# Patient Record
Sex: FEMALE | Race: WHITE | NOT HISPANIC OR LATINO | Employment: FULL TIME | URBAN - METROPOLITAN AREA
[De-identification: names, ages, dates, MRNs, and addresses within clinical notes are randomized per-mention and may not be internally consistent; named-entity substitution may affect disease eponyms.]

---

## 2017-02-23 ENCOUNTER — TELEPHONE (OUTPATIENT)
Dept: ENDOCRINOLOGY | Facility: CLINIC | Age: 42
End: 2017-02-23

## 2017-02-23 NOTE — TELEPHONE ENCOUNTER
Spoke with patient in detail whom stated that she has not been feeling her self, hair been thinning, not sleeping well and many other issues. Informed patient she has not seen the doctor since 2015. Which patient agreed so I informed patient she needed a follow up appointment so she can discuss issues with the doctor so she can get on the right track. Patient verbalize understanding and I assisted patient in scheduling follow up with Dr Ortega. Patient stated that date and time was fine. Mailed appointment slip out to patient

## 2017-02-23 NOTE — TELEPHONE ENCOUNTER
----- Message from Beena Lee sent at 2/16/2017  9:15 AM CST -----  Contact: Ingrid tel:  [phone]  Pt.says she is having issues w/ thyroid and needs to speak to you about it today.    Pls call.   May need to have some labs done.

## 2017-03-29 ENCOUNTER — OFFICE VISIT (OUTPATIENT)
Dept: ENDOCRINOLOGY | Facility: CLINIC | Age: 42
End: 2017-03-29
Payer: COMMERCIAL

## 2017-03-29 VITALS
HEART RATE: 70 BPM | BODY MASS INDEX: 20.65 KG/M2 | DIASTOLIC BLOOD PRESSURE: 64 MMHG | HEIGHT: 62 IN | SYSTOLIC BLOOD PRESSURE: 94 MMHG | WEIGHT: 112.19 LBS

## 2017-03-29 DIAGNOSIS — R53.83 FATIGUE, UNSPECIFIED TYPE: Primary | ICD-10-CM

## 2017-03-29 DIAGNOSIS — E05.00 GRAVES' DISEASE: ICD-10-CM

## 2017-03-29 PROCEDURE — 99999 PR PBB SHADOW E&M-EST. PATIENT-LVL III: CPT | Mod: PBBFAC,,, | Performed by: INTERNAL MEDICINE

## 2017-03-29 PROCEDURE — 1160F RVW MEDS BY RX/DR IN RCRD: CPT | Mod: S$GLB,,, | Performed by: INTERNAL MEDICINE

## 2017-03-29 PROCEDURE — 99214 OFFICE O/P EST MOD 30 MIN: CPT | Mod: S$GLB,,, | Performed by: INTERNAL MEDICINE

## 2017-03-29 NOTE — PROGRESS NOTES
Subjective:     Patient ID: Ingrid Gongora is a 42 y.o. female.    Chief Complaint: Graves' Disease    HPI:   Ms. Gongora is a 42 y.o. female who is here for a consult visit for evaluation Graves' disease, last visit with me was 10/2013. Dose of mmi was titrated down to zero in 2/2016.   Diagnosis of Graves disease was made four years ago and was started on methimazole therapy.     Currently reports increased weight, fatigue and requiring sleep, heat sensitivity that mostly occurs at night.   Denies any palpitations. Denies new rash.   LMP one and half weeks ago, slightly irregular but not unusual.     Taking mg for migraines, which has helped  Taking biotin and a probiotic.     Review of Systems   Constitutional: Negative for chills and fever.   HENT: Negative for congestion and sinus pressure.    Eyes: Negative for visual disturbance.   Respiratory: Negative for chest tightness and shortness of breath.    Cardiovascular: Negative for chest pain, palpitations and leg swelling.   Gastrointestinal: Negative for abdominal pain and vomiting.   Genitourinary: Negative for dysuria.   Musculoskeletal: Negative for arthralgias.   Skin: Negative for rash.   Neurological: Negative for weakness.   Hematological: Does not bruise/bleed easily.   Psychiatric/Behavioral: Negative for sleep disturbance.          Objective:     Physical Exam   Constitutional: She is oriented to person, place, and time. She appears well-developed and well-nourished. No distress.   Eyes: Conjunctivae and EOM are normal. Pupils are equal, round, and reactive to light. No scleral icterus.   No proptosis.    Neck: Normal range of motion. Neck supple. No thyromegaly present.   Cardiovascular: Normal rate and intact distal pulses.    Pulmonary/Chest: Effort normal and breath sounds normal.   Neurological: She is alert and oriented to person, place, and time. She has normal reflexes.   No tremor.   Skin: Skin is warm and dry.   Psychiatric: She has  "a normal mood and affect.       Vitals:    03/29/17 1056   BP: 94/64   BP Location: Right arm   Patient Position: Sitting   BP Method: Manual   Pulse: 70   Weight: 50.9 kg (112 lb 3.4 oz)   Height: 5' 2" (1.575 m)     Results for FANTA PATRICIO (MRN [id number]) as of 3/29/2017 11:07   Ref. Range 11/12/2014 09:00 5/15/2015 08:43 8/21/2015 09:50 2/19/2016 08:35 4/7/2016 09:35   TSH Latest Ref Range: 0.400 - 4.000 uIU/mL 2.035 2.794 2.314 2.620 1.878       Assessment/Plan:     Ms. Patricio is a 42 year old woman who appears clinically euthyroid and Grave's disease that was in remission last year after a long taper of methimazole. Having symptoms but unclear if related to thyroid. Discussed different options including QUACH treatment v another course of MMI.     1. Graves' disease    - TSH; Future  - T4, free; Future    2. Fatigue, unspecified type    - TSH; Future  - T4, free; Future        "

## 2017-03-29 NOTE — MR AVS SNAPSHOT
[facility]  [address]  [facility] [address]  Phone: [phone]  Fax: [phone]                  Ingrid Gongora   3/29/2017 11:00 AM   Office Visit    Description:  Female : 1975   Provider:  Sangeetha Ortega MD   Department:  [facility]           Reason for Visit     Graves' Disease           Diagnoses this Visit        Comments    Fatigue, unspecified type    -  Primary     Graves' disease                To Do List           Future Appointments        Provider Department Dept Phone    3/29/2017 11:45 AM LAB, APPOINTMENT [place] [facility] [phone]      Goals (5 Years of Data)     None      Follow-Up and Disposition     Follow-up and Disposition History      [facility]     [facility] Care [facility] -  Assistance  Registered nurses in the [facility] provide clinical advisement, health education, appointment booking, and other advisory services.  Call for this free service at [phone].             Medications           Message regarding Medications     Verify the changes and/or additions to your medication regime listed below are the same as discussed with your clinician today.  If any of these changes or additions are incorrect, please notify your healthcare provider.        STOP taking these medications     codeine-butalbital-ASA-caffeine 35--40 mg (BUTALBITAL-ASPIRIN-CAFFEINE-CODEINE 83--40 MG) 62--40 mg Cap Take 1 capsule by mouth every 6 (six) hours as needed.    methimazole (TAPAZOLE) 5 MG Tab TAKE (1/2) HALF BY MOUTH THREE TIMES A WEEK    methimazole (TAPAZOLE) 5 MG Tab TAKE (1/2) HALF BY MOUTH THREE TIMES A WEEK    methimazole (TAPAZOLE) 5 MG Tab Half a tablet three times a week           Verify that the below list of medications is an accurate representation of the medications you are currently taking.  If none reported, the list may be blank. If incorrect, please  "contact your healthcare provider. Carry this list with you in case of emergency.           Current Medications     eletriptan (RELPAX) 40 MG tablet Take 40 mg by mouth as needed. may repeat in 2 hours if necessary    propranolol (INDERAL) 20 MG tablet Take 20 mg by mouth every other day.    topiramate (TOPAMAX) 50 MG tablet Take 50 mg by mouth 5 times daily.           Clinical Reference Information           Your Vitals Were     BP Pulse Height Weight Last Period BMI    94/64 (BP Location: Right arm, Patient Position: Sitting, BP Method: Manual) 70 5' 2" (1.575 m) 50.9 kg (112 lb 3.4 oz) (Within Weeks) 20.52 kg/m2      Blood Pressure          Most Recent Value    BP  94/64      Allergies as of 3/29/2017     No Known Allergies      Immunizations Administered on Date of Encounter - 3/29/2017     None      Orders Placed During Today's Visit     Future Labs/Procedures Expected by Expires    Hepatic function panel  3/29/2017 5/28/2018    T4, free  3/29/2017 5/28/2018    Thyrotropin receptor antibody  3/29/2017 5/28/2018    TSH  3/29/2017 5/28/2018      Instructions    Blood work today       Language Assistance Services     ATTENTION: Language assistance services are available, free of charge. Please call [phone].      ATENCIÓN: Si habla shalonda, tiene a morales disposición servicios gratuitos de asistencia lingüística. Llame al [phone].     CHÚ Ý: N?u b?n nói Ti?ng Vi?t, có các d?ch v? h? tr? ngôn ng? mi?n phí dành cho b?n. G?i s? [phone].         [facility] complies with applicable Federal civil rights laws and does not discriminate on the basis of race, color, national origin, age, disability, or sex.        "

## 2017-04-05 ENCOUNTER — PATIENT MESSAGE (OUTPATIENT)
Dept: ENDOCRINOLOGY | Facility: CLINIC | Age: 42
End: 2017-04-05

## 2019-11-05 ENCOUNTER — PATIENT MESSAGE (OUTPATIENT)
Dept: ENDOCRINOLOGY | Facility: CLINIC | Age: 44
End: 2019-11-05

## 2019-11-05 DIAGNOSIS — E05.00 GRAVES' DISEASE: Primary | ICD-10-CM

## 2019-11-11 ENCOUNTER — LAB VISIT (OUTPATIENT)
Dept: LAB | Facility: HOSPITAL | Age: 44
End: 2019-11-11
Attending: INTERNAL MEDICINE
Payer: COMMERCIAL

## 2019-11-11 DIAGNOSIS — E05.00 GRAVES' DISEASE: ICD-10-CM

## 2019-11-11 LAB — TSH SERPL DL<=0.005 MIU/L-ACNC: 1.4 UIU/ML (ref 0.4–4)

## 2019-11-11 PROCEDURE — 83520 IMMUNOASSAY QUANT NOS NONAB: CPT

## 2019-11-11 PROCEDURE — 84443 ASSAY THYROID STIM HORMONE: CPT

## 2019-11-12 LAB — TSH RECEP AB SER-ACNC: 1.1 IU/L (ref 0–1.75)

## 2019-11-13 ENCOUNTER — PATIENT MESSAGE (OUTPATIENT)
Dept: ENDOCRINOLOGY | Facility: CLINIC | Age: 44
End: 2019-11-13

## 2019-11-22 ENCOUNTER — TELEPHONE (OUTPATIENT)
Dept: GASTROENTEROLOGY | Facility: CLINIC | Age: 44
End: 2019-11-22

## 2019-11-22 NOTE — TELEPHONE ENCOUNTER
Patient reports when she had a bowel movement , she had bright red blood on toilet paper when she wiped and also in the toilet. She states she is a little concerned. Patient reports seeing no further blood. She states this has happened one time before. She denies any symptoms such as weakness, fatigue, dizziness, sob, abdominal cramping or pain. She reports she has a history of IBS constipation. Appointment offered and accepted for 12/5/19 at 1 pm with OKSANA Miller NP. Instructed patient to go to urgent care or ER if she develops any symptoms listed above or sees any more blood. Patient verbalized understanding.

## 2019-11-22 NOTE — TELEPHONE ENCOUNTER
Delmar Castellano requesting to be seen today  Please advise   First available is at [facility] on 12/31 with Dr. Andujar

## 2019-11-22 NOTE — TELEPHONE ENCOUNTER
----- Message from Carlene Martinez sent at 11/22/2019  7:38 AM CST -----  Contact: Pt  Pt is requesting to be seen today has blood in her stool requesting a callback at [phone]

## 2019-12-05 ENCOUNTER — OFFICE VISIT (OUTPATIENT)
Dept: GASTROENTEROLOGY | Facility: CLINIC | Age: 44
End: 2019-12-05
Payer: COMMERCIAL

## 2019-12-05 ENCOUNTER — LAB VISIT (OUTPATIENT)
Dept: LAB | Facility: HOSPITAL | Age: 44
End: 2019-12-05
Payer: COMMERCIAL

## 2019-12-05 ENCOUNTER — TELEPHONE (OUTPATIENT)
Dept: GASTROENTEROLOGY | Facility: CLINIC | Age: 44
End: 2019-12-05

## 2019-12-05 VITALS
HEIGHT: 62 IN | WEIGHT: 110.69 LBS | SYSTOLIC BLOOD PRESSURE: 101 MMHG | BODY MASS INDEX: 20.37 KG/M2 | HEART RATE: 70 BPM | DIASTOLIC BLOOD PRESSURE: 69 MMHG

## 2019-12-05 DIAGNOSIS — R19.8 CHANGE IN BOWEL MOVEMENT: ICD-10-CM

## 2019-12-05 DIAGNOSIS — K62.5 BRBPR (BRIGHT RED BLOOD PER RECTUM): ICD-10-CM

## 2019-12-05 DIAGNOSIS — Z12.11 SPECIAL SCREENING FOR MALIGNANT NEOPLASMS, COLON: Primary | ICD-10-CM

## 2019-12-05 DIAGNOSIS — R19.8 CHANGE IN BOWEL MOVEMENT: Primary | ICD-10-CM

## 2019-12-05 DIAGNOSIS — R63.0 DECREASE IN APPETITE: ICD-10-CM

## 2019-12-05 LAB
ALBUMIN SERPL BCP-MCNC: 4 G/DL (ref 3.5–5.2)
ALP SERPL-CCNC: 43 U/L (ref 55–135)
ALT SERPL W/O P-5'-P-CCNC: 13 U/L (ref 10–44)
ANION GAP SERPL CALC-SCNC: 5 MMOL/L (ref 8–16)
AST SERPL-CCNC: 16 U/L (ref 10–40)
BASOPHILS # BLD AUTO: 0.02 K/UL (ref 0–0.2)
BASOPHILS NFR BLD: 0.4 % (ref 0–1.9)
BILIRUB SERPL-MCNC: 0.5 MG/DL (ref 0.1–1)
BUN SERPL-MCNC: 15 MG/DL (ref 6–20)
CALCIUM SERPL-MCNC: 9.2 MG/DL (ref 8.7–10.5)
CHLORIDE SERPL-SCNC: 110 MMOL/L (ref 95–110)
CO2 SERPL-SCNC: 25 MMOL/L (ref 23–29)
CREAT SERPL-MCNC: 1.1 MG/DL (ref 0.5–1.4)
DIFFERENTIAL METHOD: ABNORMAL
EOSINOPHIL # BLD AUTO: 0 K/UL (ref 0–0.5)
EOSINOPHIL NFR BLD: 0.4 % (ref 0–8)
ERYTHROCYTE [DISTWIDTH] IN BLOOD BY AUTOMATED COUNT: 13.4 % (ref 11.5–14.5)
EST. GFR  (AFRICAN AMERICAN): >60 ML/MIN/1.73 M^2
EST. GFR  (NON AFRICAN AMERICAN): >60 ML/MIN/1.73 M^2
GLUCOSE SERPL-MCNC: 82 MG/DL (ref 70–110)
HCT VFR BLD AUTO: 41.1 % (ref 37–48.5)
HGB BLD-MCNC: 14.2 G/DL (ref 12–16)
IMM GRANULOCYTES # BLD AUTO: 0.01 K/UL (ref 0–0.04)
IMM GRANULOCYTES NFR BLD AUTO: 0.2 % (ref 0–0.5)
LYMPHOCYTES # BLD AUTO: 1.4 K/UL (ref 1–4.8)
LYMPHOCYTES NFR BLD: 27.8 % (ref 18–48)
MCH RBC QN AUTO: 32 PG (ref 27–31)
MCHC RBC AUTO-ENTMCNC: 34.5 G/DL (ref 32–36)
MCV RBC AUTO: 93 FL (ref 82–98)
MONOCYTES # BLD AUTO: 0.5 K/UL (ref 0.3–1)
MONOCYTES NFR BLD: 9.8 % (ref 4–15)
NEUTROPHILS # BLD AUTO: 3.1 K/UL (ref 1.8–7.7)
NEUTROPHILS NFR BLD: 61.4 % (ref 38–73)
NRBC BLD-RTO: 0 /100 WBC
PLATELET # BLD AUTO: 245 K/UL (ref 150–350)
PMV BLD AUTO: 10 FL (ref 9.2–12.9)
POTASSIUM SERPL-SCNC: 4.1 MMOL/L (ref 3.5–5.1)
PROT SERPL-MCNC: 7.3 G/DL (ref 6–8.4)
RBC # BLD AUTO: 4.44 M/UL (ref 4–5.4)
SODIUM SERPL-SCNC: 140 MMOL/L (ref 136–145)
WBC # BLD AUTO: 5.11 K/UL (ref 3.9–12.7)

## 2019-12-05 PROCEDURE — 99999 PR PBB SHADOW E&M-EST. PATIENT-LVL III: ICD-10-PCS | Mod: PBBFAC,,, | Performed by: NURSE PRACTITIONER

## 2019-12-05 PROCEDURE — 85025 COMPLETE CBC W/AUTO DIFF WBC: CPT

## 2019-12-05 PROCEDURE — 80053 COMPREHEN METABOLIC PANEL: CPT

## 2019-12-05 PROCEDURE — 99204 OFFICE O/P NEW MOD 45 MIN: CPT | Mod: S$GLB,,, | Performed by: NURSE PRACTITIONER

## 2019-12-05 PROCEDURE — 99999 PR PBB SHADOW E&M-EST. PATIENT-LVL III: CPT | Mod: PBBFAC,,, | Performed by: NURSE PRACTITIONER

## 2019-12-05 PROCEDURE — 99204 PR OFFICE/OUTPT VISIT, NEW, LEVL IV, 45-59 MIN: ICD-10-PCS | Mod: S$GLB,,, | Performed by: NURSE PRACTITIONER

## 2019-12-05 PROCEDURE — 36415 COLL VENOUS BLD VENIPUNCTURE: CPT

## 2019-12-05 RX ORDER — SODIUM, POTASSIUM,MAG SULFATES 17.5-3.13G
1 SOLUTION, RECONSTITUTED, ORAL ORAL DAILY
Qty: 1 KIT | Refills: 0 | Status: SHIPPED | OUTPATIENT
Start: 2019-12-05 | End: 2019-12-07

## 2019-12-05 RX ORDER — BUTALBITAL, ACETAMINOPHEN AND CAFFEINE 50; 325; 40 MG/1; MG/1; MG/1
1 TABLET ORAL EVERY 4 HOURS PRN
COMMUNITY

## 2019-12-05 RX ORDER — IPRATROPIUM BROMIDE 21 UG/1
SPRAY, METERED NASAL
Refills: 0 | COMMUNITY
Start: 2019-12-02

## 2019-12-05 RX ORDER — FLUCONAZOLE 200 MG/1
TABLET ORAL
Refills: 2 | COMMUNITY
Start: 2019-10-14

## 2019-12-05 RX ORDER — TRANEXAMIC ACID 650 MG/1
TABLET ORAL
Refills: 0 | COMMUNITY
Start: 2019-11-11 | End: 2023-12-12

## 2019-12-05 RX ORDER — FLUCONAZOLE 150 MG/1
TABLET ORAL
Refills: 0 | COMMUNITY
Start: 2019-11-25

## 2019-12-05 RX ORDER — TRAMADOL HYDROCHLORIDE 50 MG/1
TABLET ORAL
Refills: 1 | COMMUNITY
Start: 2019-11-14

## 2019-12-05 NOTE — PROGRESS NOTES
"    [facility] Consultation Note    Reason for Consult:  The primary encounter diagnosis was Change in bowel movement. Diagnoses of Decrease in appetite and BRBPR (bright red blood per rectum) were also pertinent to this visit.    PCP:   Licha Jeff   No address on file    Referring MD:  Aaareferral Self  No address on file    HPI:  This is a 44 y.o. female here for evaluation of constipation, diarrhea and BRBPR.     Diarrhea for many years.  Reports she was seen by Dr. Turk many years ago and had a complete workup that was normal and was diagnosed with IBS.  11/22/2019 had a painful BM with with BRBPR, on the TP. She had it once before a couple weeks prior to that. Denies external hemorrhoids.  New onset of constipation. Constipation described as urgency without a BM, and can go a few days without a BM. Use to have loose bowels, many throughout the day. Stool is not as "intact" now they are loose.   Occasional abd pain. Has gas all the time. Has to take Gas-x.  Was taking miralax   She has identified foods such as meat and cream sauces bc it upsets her stomach.  Use to be on Nexium due to reported  hx of ulcers.   Denies n/v, melena  +decrease in appetite that she is unsure when that started. Also has graves disease..     ROS:  Constitutional: No fevers, chills, No weight loss  ENT: + allergies  CV: + chest pain at night  Pulm: No cough, No shortness of breath  Ophtho:+ vision changes  GI: see HPI  Derm: No rash  Heme:  No bruising  MSK: No arthritis  : No dysuria, No hematuria  Neuro: No syncope, No seizure  Psych: No anxiety, No depression    Medical History:  has a past medical history of Abnormal non-fasting glucose (2013), Graves disease, Hyperthyroidism, and Migraine headache.    Surgical History:  has a past surgical history that includes Hip arthroscopy w/ labral repair (2013).    Family History: family history includes Breast cancer in her mother and sister; Hypertension in " "her father; Prostate cancer in her maternal grandfather, maternal uncle, maternal uncle, maternal uncle, and maternal uncle; Thyroid disease in her mother..     Social History:  reports that she quit smoking about 19 years ago. Her smoking use included cigarettes. She has a 15.00 pack-year smoking history. She does not have any smokeless tobacco history on file. She reports that she drinks alcohol. She reports that she does not use drugs.    Review of patient's allergies indicates:  No Known Allergies    Current Outpatient Medications on File Prior to Visit   Medication Sig Dispense Refill    butalbital-acetaminophen-caffeine -40 mg (FIORICET, ESGIC) -40 mg per tablet Take 1 tablet by mouth every 4 (four) hours as needed.      eletriptan (RELPAX) 40 MG tablet Take 40 mg by mouth as needed. may repeat in 2 hours if necessary      fluconazole (DIFLUCAN) 150 MG Tab   0    fluconazole (DIFLUCAN) 200 MG Tab   2    FLUZONE QUAD 2468-8529, PF, 60 mcg (15 mcg x 4)/0.5 mL Syrg PHARMACIST ADMINISTERED IMMUNIZATION ADMINISTERED AT TIME OF DISPENSING  0    ipratropium (ATROVENT) 0.03 % nasal spray   0    topiramate (TOPAMAX) 50 MG tablet Take 50 mg by mouth 5 times daily.      traMADol (ULTRAM) 50 mg tablet   1    tranexamic acid (LYSTEDA) 650 mg tablet   0    [DISCONTINUED] propranolol (INDERAL) 20 MG tablet Take 20 mg by mouth every other day.       No current facility-administered medications on file prior to visit.          Objective Findings:    Vital Signs:  /69 (BP Location: Right arm)   Pulse 70   Ht 5' 2" (1.575 m)   Wt 50.2 kg (110 lb 10.7 oz)   BMI 20.24 kg/m²   Body mass index is 20.24 kg/m².    Physical Exam:  General Appearance: Well appearing in no acute distress  Head:   Normocephalic, without obvious abnormality  Eyes:    No scleral icterus  ENT: Neck supple  Lungs: CTA bilaterally in anterior and posterior fields, no wheezes, no crackles.  Heart:  Regular rate and rhythm, S1, S2 " normal, no murmurs heard  Abdomen: Soft, non tender, non distended with positive bowel sounds in all four quadrants. No hepatosplenomegaly, ascites, or mass  Extremities: No edema  Skin: No rash  Neurologic: AAO x 3      Labs:  Lab Results   Component Value Date    WBC 8.01 03/25/2004    HGB 13.6 03/25/2004    HCT 41.0 03/25/2004     03/25/2004    ALT 18 03/29/2017    AST 21 03/29/2017     05/05/2005    K 4.6 05/05/2005     05/05/2005    CREATININE 0.9 05/05/2005    BUN 22 05/05/2005    CO2 26 05/05/2005    TSH 1.404 11/11/2019       Imaging reviewed:    Endoscopy: reviewed    EGD  4/08/2008 for epigastric pain and nausea.  Normal study.    Flex sig 3/16/2005    Assessment:  Ms. Gongora is a 44 y.o. WF with    1. Change in bowel movement    2. Decrease in appetite    3. BRBPR (bright red blood per rectum)         Patient reports she has a chronic history of diarrhea, at least since 2005 with when she was worked up by Dr. Turk and was diagnosed with IBS.  Most recently however she has developed constipation where she can go days without having a bowel movement, this is new for her.  She also has recently noted bright red blood per rectum.  Does have a history of internal hemorrhoids.  But given the recent change in her bowel habits and the bleeding will perform colonoscopy.    Recommendations:  1.  Patient to start taking a fiber supplement such as Citrucel or Benefiber  2.  Labs  3.  Colonoscopy    Follow-up pending colonoscopy    Order summary:  Orders Placed This Encounter    Comprehensive metabolic panel    CBC auto differential    Case request GI: COLONOSCOPY         Thank you so much for allowing me to participate in the care of Ingrid Gongora    Ingrid Miller, IVELISSE-KERLINE

## 2019-12-05 NOTE — TELEPHONE ENCOUNTER
MA contacted pt to give test results per Ingrid. Pt verbalized understanding.           ----- Message from Ingrid Miller NP sent at 12/5/2019  4:50 PM CST -----  Labs look good

## 2019-12-27 ENCOUNTER — PATIENT MESSAGE (OUTPATIENT)
Dept: ENDOCRINOLOGY | Facility: CLINIC | Age: 44
End: 2019-12-27

## 2019-12-27 NOTE — TELEPHONE ENCOUNTER
She would need to see a provider to have it looked it (derm or pcp)... There are many reasons for hair loss

## 2020-01-06 ENCOUNTER — TELEPHONE (OUTPATIENT)
Dept: GASTROENTEROLOGY | Facility: CLINIC | Age: 45
End: 2020-01-06

## 2020-01-06 NOTE — TELEPHONE ENCOUNTER
MA spoke with pt. Pt wanted to make Ingrid aware her uncle  of colon cancer not prostate.       ----- Message from Mary Ann Medrano sent at 2020  1:16 PM CST -----  Contact: patient  Please call patient at [phone] have information for the nurse about colon cancer in the family waiting on a call back thanks.

## 2020-01-09 ENCOUNTER — ANESTHESIA (OUTPATIENT)
Dept: ENDOSCOPY | Facility: HOSPITAL | Age: 45
End: 2020-01-09
Payer: COMMERCIAL

## 2020-01-09 ENCOUNTER — HOSPITAL ENCOUNTER (OUTPATIENT)
Facility: HOSPITAL | Age: 45
Discharge: HOME | End: 2020-01-09
Attending: INTERNAL MEDICINE | Admitting: INTERNAL MEDICINE
Payer: COMMERCIAL

## 2020-01-09 ENCOUNTER — ANESTHESIA EVENT (OUTPATIENT)
Dept: ENDOSCOPY | Facility: HOSPITAL | Age: 45
End: 2020-01-09
Payer: COMMERCIAL

## 2020-01-09 VITALS
OXYGEN SATURATION: 100 % | HEART RATE: 68 BPM | SYSTOLIC BLOOD PRESSURE: 101 MMHG | TEMPERATURE: 98 F | RESPIRATION RATE: 17 BRPM | WEIGHT: 110 LBS | HEIGHT: 62 IN | DIASTOLIC BLOOD PRESSURE: 57 MMHG | BODY MASS INDEX: 20.24 KG/M2

## 2020-01-09 DIAGNOSIS — K62.5 RECTAL BLEEDING: ICD-10-CM

## 2020-01-09 DIAGNOSIS — K62.5 RECTAL BLEED: Primary | ICD-10-CM

## 2020-01-09 PROCEDURE — 45380 PR COLONOSCOPY,BIOPSY: ICD-10-PCS | Mod: ,,, | Performed by: INTERNAL MEDICINE

## 2020-01-09 PROCEDURE — 37000008 HC ANESTHESIA 1ST 15 MINUTES: Performed by: INTERNAL MEDICINE

## 2020-01-09 PROCEDURE — 88305 TISSUE EXAM BY PATHOLOGIST: CPT | Performed by: PATHOLOGY

## 2020-01-09 PROCEDURE — 27201012 HC FORCEPS, HOT/COLD, DISP: Performed by: INTERNAL MEDICINE

## 2020-01-09 PROCEDURE — 88305 TISSUE EXAM BY PATHOLOGIST: CPT | Mod: 26,,, | Performed by: PATHOLOGY

## 2020-01-09 PROCEDURE — 45380 COLONOSCOPY AND BIOPSY: CPT | Performed by: INTERNAL MEDICINE

## 2020-01-09 PROCEDURE — 37000009 HC ANESTHESIA EA ADD 15 MINS: Performed by: INTERNAL MEDICINE

## 2020-01-09 PROCEDURE — E9220 PRA ENDO ANESTHESIA: HCPCS | Mod: ,,, | Performed by: NURSE ANESTHETIST, CERTIFIED REGISTERED

## 2020-01-09 PROCEDURE — 88305 TISSUE EXAM BY PATHOLOGIST: ICD-10-PCS | Mod: 26,,, | Performed by: PATHOLOGY

## 2020-01-09 PROCEDURE — 63600175 PHARM REV CODE 636 W HCPCS: Performed by: INTERNAL MEDICINE

## 2020-01-09 PROCEDURE — 63600175 PHARM REV CODE 636 W HCPCS: Performed by: NURSE ANESTHETIST, CERTIFIED REGISTERED

## 2020-01-09 PROCEDURE — E9220 PRA ENDO ANESTHESIA: ICD-10-PCS | Mod: ,,, | Performed by: NURSE ANESTHETIST, CERTIFIED REGISTERED

## 2020-01-09 PROCEDURE — 45380 COLONOSCOPY AND BIOPSY: CPT | Mod: ,,, | Performed by: INTERNAL MEDICINE

## 2020-01-09 RX ORDER — LIDOCAINE HCL/PF 100 MG/5ML
SYRINGE (ML) INTRAVENOUS
Status: DISCONTINUED | OUTPATIENT
Start: 2020-01-09 | End: 2020-01-09

## 2020-01-09 RX ORDER — PROPOFOL 10 MG/ML
VIAL (ML) INTRAVENOUS CONTINUOUS PRN
Status: DISCONTINUED | OUTPATIENT
Start: 2020-01-09 | End: 2020-01-09

## 2020-01-09 RX ORDER — SODIUM CHLORIDE 9 MG/ML
INJECTION, SOLUTION INTRAVENOUS CONTINUOUS
Status: DISCONTINUED | OUTPATIENT
Start: 2020-01-09 | End: 2020-01-09 | Stop reason: HOSPADM

## 2020-01-09 RX ORDER — PROPOFOL 10 MG/ML
VIAL (ML) INTRAVENOUS
Status: DISCONTINUED | OUTPATIENT
Start: 2020-01-09 | End: 2020-01-09

## 2020-01-09 RX ADMIN — PROPOFOL 50 MG: 10 INJECTION, EMULSION INTRAVENOUS at 12:01

## 2020-01-09 RX ADMIN — PROPOFOL 150 MCG/KG/MIN: 10 INJECTION, EMULSION INTRAVENOUS at 12:01

## 2020-01-09 RX ADMIN — SODIUM CHLORIDE: 0.9 INJECTION, SOLUTION INTRAVENOUS at 12:01

## 2020-01-09 RX ADMIN — PROPOFOL 30 MG: 10 INJECTION, EMULSION INTRAVENOUS at 12:01

## 2020-01-09 RX ADMIN — LIDOCAINE HYDROCHLORIDE 50 MG: 20 INJECTION, SOLUTION INTRAVENOUS at 12:01

## 2020-01-09 RX ADMIN — PROPOFOL 20 MG: 10 INJECTION, EMULSION INTRAVENOUS at 12:01

## 2020-01-09 RX ADMIN — PROPOFOL 30 MG: 10 INJECTION, EMULSION INTRAVENOUS at 01:01

## 2020-01-09 NOTE — H&P
[facility]  History & Physical    Subjective:      Chief Complaint/Reason for Admission: rectal bleedStepeulogio Gongora is a 44 y.o.    Past Medical History:   Diagnosis Date    Abnormal non-fasting glucose     Graves disease     Hyperthyroidism     Migraine headache      Past Surgical History:   Procedure Laterality Date    HIP ARTHROSCOPY W/ LABRAL REPAIR       Family History   Problem Relation Age of Onset    Thyroid disease Mother     Breast cancer Mother     Hypertension Father     Breast cancer Sister     Prostate cancer Maternal Uncle     Prostate cancer Maternal Grandfather     Prostate cancer Maternal Uncle     Colon cancer Maternal Uncle     Prostate cancer Maternal Uncle     Celiac disease Neg Hx     Colon polyps Neg Hx     Crohn's disease Neg Hx     Esophageal cancer Neg Hx     Liver cancer Neg Hx     Liver disease Neg Hx     Rectal cancer Neg Hx     Stomach cancer Neg Hx      Social History     Tobacco Use    Smoking status: Former Smoker     Packs/day: 1.00     Years: 15.00     Pack years: 15.00     Types: Cigarettes     Last attempt to quit: 2000     Years since quittin.6   Substance Use Topics    Alcohol use: Yes     Comment: very rarely    Drug use: No       PTA Medications   Medication Sig    butalbital-acetaminophen-caffeine -40 mg (FIORICET, ESGIC) -40 mg per tablet Take 1 tablet by mouth every 4 (four) hours as needed.    eletriptan (RELPAX) 40 MG tablet Take 40 mg by mouth as needed. may repeat in 2 hours if necessary    fluconazole (DIFLUCAN) 150 MG Tab     fluconazole (DIFLUCAN) 200 MG Tab     FLUZONE QUAD 6090-1410, PF, 60 mcg (15 mcg x 4)/0.5 mL Syrg PHARMACIST ADMINISTERED IMMUNIZATION ADMINISTERED AT TIME OF DISPENSING    ipratropium (ATROVENT) 0.03 % nasal spray     topiramate (TOPAMAX) 50 MG tablet Take 50 mg by mouth 5 times daily.    traMADol (ULTRAM) 50 mg tablet     tranexamic acid (LYSTEDA) 650 mg  tablet      Review of patient's allergies indicates:  No Known Allergies     Review of Systems   Respiratory: Negative.    Cardiovascular: Negative.        Objective:      Vital Signs (Most Recent)  Temp: 98.2 °F (36.8 °C) (01/09/20 1229)  Pulse: 67 (01/09/20 1229)  Resp: 15 (01/09/20 1229)  BP: (!) 101/57 (01/09/20 1229)  SpO2: 100 % (01/09/20 1229)    Vital Signs Range (Last 24H):  Temp:  [98.2 °F (36.8 °C)]   Pulse:  [67]   Resp:  [15]   BP: (101)/(57)   SpO2:  [100 %]     Physical Exam   Cardiovascular: Normal rate and regular rhythm.   Pulmonary/Chest: Effort normal and breath sounds normal.       Data Review:     ECG: .     Assessment:      There are no hospital problems to display for this patient.      Plan:    Indication for procedure:    ASA:II  Airway normal  Malampati class:per anes    Personal and family history negative for anesthesia problems    Plan:colon  Anesthesia plan: general

## 2020-01-09 NOTE — TRANSFER OF CARE
"Anesthesia Transfer of Care Note    Patient: Ingrid Gongora    Procedure(s) Performed: Procedure(s) (LRB):  COLONOSCOPY (N/A)    Patient location: PACU    Anesthesia Type: general    Transport from OR: Transported from OR on room air with adequate spontaneous ventilation    Post pain: adequate analgesia    Post assessment: no apparent anesthetic complications and tolerated procedure well    Post vital signs: stable    Level of consciousness: awake, alert and oriented    Nausea/Vomiting: no nausea/vomiting    Complications: none    Transfer of care protocol was followed      Last vitals:   Visit Vitals  BP 91/52   Pulse 74   Temp 36.8 °C (98.2 °F)   Resp 15   Ht 5' 2" (1.575 m)   Wt 49.9 kg (110 lb)   SpO2 100%   BMI 20.12 kg/m²     "

## 2020-01-09 NOTE — DISCHARGE INSTRUCTIONS

## 2020-01-09 NOTE — [employer] PATIENT INSTRUCTIONS
Discharge Summary/Instructions after an Endoscopic Procedure  Patient Name: Ingrid Gongora  Patient MRN: [id number]  Patient YOB: 1975 Thursday, January 09, 2020  Theo Turk MD  RESTRICTIONS:  During your procedure today, you received medications for sedation.  These   medications may affect your judgment, balance and coordination.  Therefore,   for 24 hours, you have the following restrictions:   - DO NOT drive a car, operate machinery, make legal/financial decisions,   sign important papers or drink alcohol.    ACTIVITY:  Today: no heavy lifting, straining or running due to procedural   sedation/anesthesia.  The following day: return to full activity including work.  DIET:  Eat and drink normally unless instructed otherwise.     TREATMENT FOR COMMON SIDE EFFECTS:  - Mild abdominal pain, nausea, belching, bloating or excessive gas:  rest,   eat lightly and use a heating pad.  - Sore Throat: treat with throat lozenges and/or gargle with warm salt   water.  - Because air was used during the procedure, expelling large amounts of air   from your rectum or belching is normal.  - If a bowel prep was taken, you may not have a bowel movement for 1-3 days.    This is normal.  SYMPTOMS TO WATCH FOR AND REPORT TO YOUR PHYSICIAN:  1. Abdominal pain or bloating, other than gas cramps.  2. Chest pain.  3. Back pain.  4. Signs of infection such as: chills or fever occurring within 24 hours   after the procedure.  5. Rectal bleeding, which would show as bright red, maroon, or black stools.   (A tablespoon of blood from the rectum is not serious, especially if   hemorrhoids are present.)  6. Vomiting.  7. Weakness or dizziness.  GO DIRECTLY TO THE NEAREST EMERGENCY ROOM IF YOU HAVE ANY OF THE FOLLOWING:      Difficulty breathing              Chills and/or fever over 101 F   Persistent vomiting and/or vomiting blood   Severe abdominal pain   Severe chest pain   Black, tarry stools   Bleeding- more than one  tablespoon   Any other symptom or condition that you feel may need urgent attention  Your doctor recommends these additional instructions:  If any biopsies were taken, your doctors clinic will contact you in 1 to 2   weeks with any results.  - Patient has a contact number available for emergencies.  The signs and   symptoms of potential delayed complications were discussed with the   patient.  Return to normal activities tomorrow.  Written discharge   instructions were provided to the patient.   - Discharge patient to home (ambulatory).   - Resume previous diet.   - Continue present medications.   - If the pathology report reveals adenomatous tissue, then repeat the   colonoscopy for surveillance in 5 years.  For questions, problems or results please call your physician - Theo Turk MD at Work:  [phone].  [facility], EMERGENCY ROOM PHONE NUMBER: [phone]  IF A COMPLICATION OR EMERGENCY SITUATION ARISES AND YOU ARE UNABLE TO REACH   YOUR PHYSICIAN - GO DIRECTLY TO THE EMERGENCY ROOM.  Theo Turk MD  1/9/2020 1:20:17 PM  This report has been verified and signed electronically.  [employer]

## 2020-01-09 NOTE — ANESTHESIA POSTPROCEDURE EVALUATION
Anesthesia Post Evaluation    Patient: Ingrid Gongora    Procedure(s) Performed: Procedure(s) (LRB):  COLONOSCOPY (N/A)    Final Anesthesia Type: general    Patient location during evaluation: GI PACU  Patient participation: Yes- Able to Participate  Level of consciousness: awake and alert and oriented  Post-procedure vital signs: reviewed and stable  Pain management: adequate  Airway patency: patent    PONV status at discharge: No PONV  Anesthetic complications: no      Cardiovascular status: hemodynamically stable  Respiratory status: unassisted, spontaneous ventilation and room air  Hydration status: euvolemic  Follow-up not needed.          Vitals Value Taken Time   /57 1/9/2020  1:52 PM   Temp 36.7 °C (98.1 °F) 1/9/2020  1:22 PM   Pulse 68 1/9/2020  1:52 PM   Resp 17 1/9/2020  1:52 PM   SpO2 100 % 1/9/2020  1:52 PM         Event Time     Out of Recovery 13:52:00          Pain/Luna Score: Luna Score: 10 (1/9/2020  1:52 PM)

## 2020-01-09 NOTE — ANESTHESIA PREPROCEDURE EVALUATION
01/09/2020  Ingrid Gongora is a 44 y.o., female.  Past Medical History:   Diagnosis Date    Abnormal non-fasting glucose 2013    Graves disease     Hyperthyroidism     Migraine headache      Patient Active Problem List   Diagnosis    Graves' disease    Rectal bleeding         Anesthesia Evaluation    I have reviewed the Patient Summary Reports.     I have reviewed the Medications.     Review of Systems  Anesthesia Hx:  No problems with previous Anesthesia Denies Hx of Anesthetic complications  Denies Family Hx of Anesthesia complications.  Personal Hx of Anesthesia complications Slow To Awaken/Delayed Emergence and mild, somewhat sensitive to sedation / narcotics   Hematology/Oncology:  Hematology Normal   Oncology Normal     EENT/Dental:EENT/Dental Normal   Cardiovascular:  Cardiovascular Normal Exercise tolerance: good     Pulmonary:  Pulmonary Normal    Renal/:  Renal/ Normal     Hepatic/GI:  Hepatic/GI Normal Bowel Prep.    Musculoskeletal:  Musculoskeletal Normal    Neurological:   Headaches    Endocrine:   Hyperthyroidism    Dermatological:  Skin Normal    Psych:  Psychiatric Normal           Physical Exam  General:  Well nourished    Airway/Jaw/Neck:  Airway Findings: Mouth Opening: Normal Tongue: Normal  General Airway Assessment: Adult  Mallampati: II  TM Distance: Normal, at least 6 cm  Jaw/Neck Findings:  Neck ROM: Normal ROM     Eyes/Ears/Nose:  EYES/EARS/NOSE FINDINGS: Normal   Dental:  Dental Findings: In tact   Chest/Lungs:  Chest/Lungs Findings: Clear to auscultation, Normal Respiratory Rate     Heart/Vascular:  Heart Findings: Rate: Normal  Sounds: Normal     Abdomen:  Abdomen Findings: Normal    Musculoskeletal:  Musculoskeletal Findings: Normal   Skin:  Skin Findings: Normal    Mental Status:  Mental Status Findings:  Cooperative, Alert and Oriented         Anesthesia  Plan  Type of Anesthesia, risks & benefits discussed:  Anesthesia Type:  general  Patient's Preference: General  Intra-op Monitoring Plan: standard ASA monitors  Intra-op Monitoring Plan Comments:   Post Op Pain Control Plan:   Post Op Pain Control Plan Comments:   Induction:   IV  Beta Blocker:  Patient is not currently on a Beta-Blocker (No further documentation required).       Informed Consent: Patient understands risks and agrees with Anesthesia plan.  Questions answered. Anesthesia consent signed with patient.  ASA Score: 2     Day of Surgery Review of History & Physical: I have interviewed and examined the patient. I have reviewed the patient's H&P dated:  There are no significant changes.  H&P update referred to the provider.         Ready For Surgery From Anesthesia Perspective.

## 2020-01-09 NOTE — PROGRESS NOTES
Upon arrival to recovery, patient c/o of migraine pain rating it a 10/10. Per Dr. Phillips with Anesthesia, OK for patient to take her home medication, 50mg PO Tramadol.  Will continue to monitor.

## 2020-01-17 ENCOUNTER — PATIENT MESSAGE (OUTPATIENT)
Dept: GASTROENTEROLOGY | Facility: CLINIC | Age: 45
End: 2020-01-17

## 2020-01-17 LAB
FINAL PATHOLOGIC DIAGNOSIS: NORMAL
GROSS: NORMAL

## 2020-09-03 ENCOUNTER — PATIENT MESSAGE (OUTPATIENT)
Dept: GASTROENTEROLOGY | Facility: CLINIC | Age: 45
End: 2020-09-03

## 2020-09-03 NOTE — TELEPHONE ENCOUNTER
MA contacted pt to scheduled pt for a virtual visit at the [facility] . Pt is not able to drink or eat and has upper chest pain. Pt did not answer, MA LVM for pt to give the clinic a call.

## 2020-09-04 ENCOUNTER — TELEPHONE (OUTPATIENT)
Dept: GASTROENTEROLOGY | Facility: CLINIC | Age: 45
End: 2020-09-04

## 2020-09-04 NOTE — TELEPHONE ENCOUNTER
MA called pt back no answer LVM for pt to call back.----- Message from Laina Sheikh sent at 9/4/2020  2:39 PM CDT -----  Regarding: PT  Contact: PT  PT called to make a virtual appointment but the soonest one is in the middle of October and she said she needs to see someone before that. Please call back     Callback: [phone]

## 2021-04-15 ENCOUNTER — PATIENT MESSAGE (OUTPATIENT)
Dept: RESEARCH | Facility: HOSPITAL | Age: 46
End: 2021-04-15

## 2021-04-27 ENCOUNTER — PATIENT MESSAGE (OUTPATIENT)
Dept: GASTROENTEROLOGY | Facility: CLINIC | Age: 46
End: 2021-04-27

## 2022-02-17 ENCOUNTER — PATIENT MESSAGE (OUTPATIENT)
Dept: ENDOCRINOLOGY | Facility: CLINIC | Age: 47
End: 2022-02-17
Payer: COMMERCIAL

## 2023-09-26 ENCOUNTER — OFFICE VISIT (OUTPATIENT)
Dept: SURGERY | Facility: CLINIC | Age: 48
End: 2023-09-26
Payer: COMMERCIAL

## 2023-09-26 VITALS
HEIGHT: 62 IN | BODY MASS INDEX: 21.25 KG/M2 | HEART RATE: 74 BPM | SYSTOLIC BLOOD PRESSURE: 101 MMHG | DIASTOLIC BLOOD PRESSURE: 75 MMHG | WEIGHT: 115.5 LBS | RESPIRATION RATE: 16 BRPM

## 2023-09-26 DIAGNOSIS — K62.89 ANAL IRRITATION: Primary | ICD-10-CM

## 2023-09-26 PROCEDURE — 46600 DIAGNOSTIC ANOSCOPY SPX: CPT | Mod: S$GLB,,, | Performed by: NURSE PRACTITIONER

## 2023-09-26 PROCEDURE — 3074F SYST BP LT 130 MM HG: CPT | Mod: CPTII,S$GLB,, | Performed by: NURSE PRACTITIONER

## 2023-09-26 PROCEDURE — 3008F BODY MASS INDEX DOCD: CPT | Mod: CPTII,S$GLB,, | Performed by: NURSE PRACTITIONER

## 2023-09-26 PROCEDURE — 1159F MED LIST DOCD IN RCRD: CPT | Mod: CPTII,S$GLB,, | Performed by: NURSE PRACTITIONER

## 2023-09-26 PROCEDURE — 99204 PR OFFICE/OUTPT VISIT, NEW, LEVL IV, 45-59 MIN: ICD-10-PCS | Mod: 25,S$GLB,, | Performed by: NURSE PRACTITIONER

## 2023-09-26 PROCEDURE — 99999 PR PBB SHADOW E&M-EST. PATIENT-LVL III: CPT | Mod: PBBFAC,,, | Performed by: NURSE PRACTITIONER

## 2023-09-26 PROCEDURE — 3008F PR BODY MASS INDEX (BMI) DOCUMENTED: ICD-10-PCS | Mod: CPTII,S$GLB,, | Performed by: NURSE PRACTITIONER

## 2023-09-26 PROCEDURE — 3078F DIAST BP <80 MM HG: CPT | Mod: CPTII,S$GLB,, | Performed by: NURSE PRACTITIONER

## 2023-09-26 PROCEDURE — 3078F PR MOST RECENT DIASTOLIC BLOOD PRESSURE < 80 MM HG: ICD-10-PCS | Mod: CPTII,S$GLB,, | Performed by: NURSE PRACTITIONER

## 2023-09-26 PROCEDURE — 1159F PR MEDICATION LIST DOCUMENTED IN MEDICAL RECORD: ICD-10-PCS | Mod: CPTII,S$GLB,, | Performed by: NURSE PRACTITIONER

## 2023-09-26 PROCEDURE — 99204 OFFICE O/P NEW MOD 45 MIN: CPT | Mod: 25,S$GLB,, | Performed by: NURSE PRACTITIONER

## 2023-09-26 PROCEDURE — 99999 PR PBB SHADOW E&M-EST. PATIENT-LVL III: ICD-10-PCS | Mod: PBBFAC,,, | Performed by: NURSE PRACTITIONER

## 2023-09-26 PROCEDURE — 46600 PR DIAG2STIC A2SCOPY: ICD-10-PCS | Mod: S$GLB,,, | Performed by: NURSE PRACTITIONER

## 2023-09-26 PROCEDURE — 3074F PR MOST RECENT SYSTOLIC BLOOD PRESSURE < 130 MM HG: ICD-10-PCS | Mod: CPTII,S$GLB,, | Performed by: NURSE PRACTITIONER

## 2023-09-26 RX ORDER — PROGESTERONE 100 MG/1
100 CAPSULE ORAL NIGHTLY
COMMUNITY
Start: 2023-07-01

## 2023-09-26 NOTE — PROGRESS NOTES
CRS Office Visit History and Physical    Referring Md:   Self, Aaareferral  No address on file    SUBJECTIVE:     Chief Complaint: rectal rash    History of Present Illness:  The patient is new patient to this practice.   Course is as follows:  Patient is a 48 y.o. female presents with rectal rash.  Symptoms have been present for 2 weeks. Itchy at night.   Witch hazel mildly helped  No pain to rectum, no pustulant drainage  Scant BRB w a BM  Constipation, citrucel in the am and PM.   Drinks plenty of water  Wet wipe use    Last Colonoscopy: 1 TA removed in   Family history of colorectal cancer or IBD: no.    Review of patient's allergies indicates:  No Known Allergies    Past Medical History:   Diagnosis Date    Abnormal non-fasting glucose     Graves disease     Hyperthyroidism     Migraine headache      Past Surgical History:   Procedure Laterality Date    COLONOSCOPY N/A 2020    Procedure: COLONOSCOPY;  Surgeon: Theo Turk MD;  Location: [facility] ([address] [place] [address]);  Service: Endoscopy;  Laterality: N/A;    HIP ARTHROSCOPY W/ LABRAL REPAIR       Family History   Problem Relation Age of Onset    Thyroid disease Mother     Breast cancer Mother     Hypertension Father     Breast cancer Sister     Prostate cancer Maternal Uncle     Prostate cancer Maternal Grandfather     Prostate cancer Maternal Uncle     Colon cancer Maternal Uncle     Prostate cancer Maternal Uncle     Celiac disease Neg Hx     Colon polyps Neg Hx     Crohn's disease Neg Hx     Esophageal cancer Neg Hx     Liver cancer Neg Hx     Liver disease Neg Hx     Rectal cancer Neg Hx     Stomach cancer Neg Hx      Social History     Tobacco Use    Smoking status: Former     Current packs/day: 0.00     Average packs/day: 1 pack/day for 15.0 years (15.0 ttl pk-yrs)     Types: Cigarettes     Start date: 1985     Quit date: 2000     Years since quittin.4   Substance Use Topics    Alcohol use: Yes     Comment: very rarely    Drug use:  "No        Review of Systems:  Review of Systems   Constitutional:  Negative for chills and fever.       OBJECTIVE:     Vital Signs (Most Recent)  /75 (BP Location: Right arm, Patient Position: Sitting, BP Method: Large (Automatic))   Pulse 74   Resp 16   Ht 5' 2" (1.575 m)   Wt 52.4 kg (115 lb 8.3 oz)   BMI 21.13 kg/m²     Physical Exam:  General: White female in no distress   Neuro: Alert and oriented to person, place, and time.  Moves all extremities.     HEENT: No icterus.  Trachea midline  Respiratory: Respirations are even and unlabored, no cough or audible wheezing  Skin: Warm dry and intact, No visible rashes, no jaundice    Labs reviewed today:  Lab Results   Component Value Date    WBC 5.11 12/05/2019    HGB 14.2 12/05/2019    HCT 41.1 12/05/2019     12/05/2019    ALT 13 12/05/2019    AST 16 12/05/2019     12/05/2019    K 4.1 12/05/2019     12/05/2019    CREATININE 1.1 12/05/2019    BUN 15 12/05/2019    CO2 25 12/05/2019    TSH 1.404 11/11/2019       Endoscopy reviewed today:  See HPI      Anorectal Exam:    Anal Skin: L Lateral anterior raised rash, with excoriation, no hard lump. Not a fissure, not an abscess    Digital Rectal Exam:  Resting Tone normal  Mass none  Tenderness  absent    Anoscopy:  Verbal consent was obtained.   Clear plastic anoscope inserted.    Hemorrhoids  absent  Stigmata of bleeding  absent  Stigmata of prolapsed  absent  Distal rectal mucosa normal      ASSESSMENT/PLAN:     Diagnoses and all orders for this visit:    Anal irritation    Anal irritation present, could be from wet wipe use. Encouraged to stop, use metamucil instead of cirtrucel for constipation, trial Calmoseptine, if no improvement trial steroid cream    F/u PRN    MIHAI Valdivia  Colon and Rectal Surgery      "

## 2023-10-10 ENCOUNTER — PATIENT MESSAGE (OUTPATIENT)
Dept: SURGERY | Facility: CLINIC | Age: 48
End: 2023-10-10
Payer: COMMERCIAL

## 2023-12-04 ENCOUNTER — PATIENT MESSAGE (OUTPATIENT)
Dept: SURGERY | Facility: CLINIC | Age: 48
End: 2023-12-04
Payer: COMMERCIAL

## 2023-12-12 ENCOUNTER — OFFICE VISIT (OUTPATIENT)
Dept: SURGERY | Facility: CLINIC | Age: 48
End: 2023-12-12
Payer: COMMERCIAL

## 2023-12-12 VITALS
HEART RATE: 76 BPM | BODY MASS INDEX: 22.18 KG/M2 | SYSTOLIC BLOOD PRESSURE: 113 MMHG | DIASTOLIC BLOOD PRESSURE: 73 MMHG | WEIGHT: 121.25 LBS

## 2023-12-12 DIAGNOSIS — K62.89 ANAL IRRITATION: Primary | ICD-10-CM

## 2023-12-12 PROCEDURE — 99999 PR PBB SHADOW E&M-EST. PATIENT-LVL IV: CPT | Mod: PBBFAC,,, | Performed by: NURSE PRACTITIONER

## 2023-12-12 PROCEDURE — 1159F MED LIST DOCD IN RCRD: CPT | Mod: CPTII,S$GLB,, | Performed by: NURSE PRACTITIONER

## 2023-12-12 PROCEDURE — 99214 PR OFFICE/OUTPT VISIT, EST, LEVL IV, 30-39 MIN: ICD-10-PCS | Mod: S$GLB,,, | Performed by: NURSE PRACTITIONER

## 2023-12-12 PROCEDURE — 3078F DIAST BP <80 MM HG: CPT | Mod: CPTII,S$GLB,, | Performed by: NURSE PRACTITIONER

## 2023-12-12 PROCEDURE — 99214 OFFICE O/P EST MOD 30 MIN: CPT | Mod: S$GLB,,, | Performed by: NURSE PRACTITIONER

## 2023-12-12 PROCEDURE — 3074F SYST BP LT 130 MM HG: CPT | Mod: CPTII,S$GLB,, | Performed by: NURSE PRACTITIONER

## 2023-12-12 PROCEDURE — 1159F PR MEDICATION LIST DOCUMENTED IN MEDICAL RECORD: ICD-10-PCS | Mod: CPTII,S$GLB,, | Performed by: NURSE PRACTITIONER

## 2023-12-12 PROCEDURE — 3008F BODY MASS INDEX DOCD: CPT | Mod: CPTII,S$GLB,, | Performed by: NURSE PRACTITIONER

## 2023-12-12 PROCEDURE — 3008F PR BODY MASS INDEX (BMI) DOCUMENTED: ICD-10-PCS | Mod: CPTII,S$GLB,, | Performed by: NURSE PRACTITIONER

## 2023-12-12 PROCEDURE — 3074F PR MOST RECENT SYSTOLIC BLOOD PRESSURE < 130 MM HG: ICD-10-PCS | Mod: CPTII,S$GLB,, | Performed by: NURSE PRACTITIONER

## 2023-12-12 PROCEDURE — 99999 PR PBB SHADOW E&M-EST. PATIENT-LVL IV: ICD-10-PCS | Mod: PBBFAC,,, | Performed by: NURSE PRACTITIONER

## 2023-12-12 PROCEDURE — 3078F PR MOST RECENT DIASTOLIC BLOOD PRESSURE < 80 MM HG: ICD-10-PCS | Mod: CPTII,S$GLB,, | Performed by: NURSE PRACTITIONER

## 2023-12-12 RX ORDER — CLOBETASOL PROPIONATE 0.5 MG/G
OINTMENT TOPICAL 2 TIMES DAILY
Qty: 30 G | Refills: 1 | Status: SHIPPED | OUTPATIENT
Start: 2023-12-12 | End: 2024-02-23 | Stop reason: SDUPTHER

## 2023-12-12 NOTE — PATIENT INSTRUCTIONS
Use clobetasol cream twice a day for 7 days, then take a break for 3 days, then twice a day for 5 days. Then take a break for 3 days, then twice a day for 3 days.    For the Critic aid AF, use twice a day for for 10 days

## 2023-12-12 NOTE — PROGRESS NOTES
CRS Office Visit History and Physical    Referring Md:   No referring provider defined for this encounter.    SUBJECTIVE:   Chief Complaint: rectal rash     History of Present Illness 9/26/2023:  The patient is new patient to this practice.   Course is as follows:  Patient is a 48 y.o. female presents with rectal rash.  Symptoms have been present for 2 weeks. Itchy at night.   Witch hazel mildly helped  No pain to rectum, no pustulant drainage  Scant BRB w a BM  Constipation, citrucel in the am and PM.   Drinks plenty of water  Wet wipe use     Last Colonoscopy: 1 TA removed in 2020  Family history of colorectal cancer or IBD: no.     Interval hx 12/12/2023  Stopped wet wipes.   Used the calmoseptine cream, soothes for a little bit  Having bleeding w a BM    Review of patient's allergies indicates:  No Known Allergies    Past Medical History:   Diagnosis Date    Abnormal non-fasting glucose 2013    Graves disease     Hyperthyroidism     Migraine headache      Past Surgical History:   Procedure Laterality Date    COLONOSCOPY N/A 1/9/2020    Procedure: COLONOSCOPY;  Surgeon: Theo Turk MD;  Location: [facility] ([address] [place] [address]);  Service: Endoscopy;  Laterality: N/A;    HIP ARTHROSCOPY W/ LABRAL REPAIR  2013     Family History   Problem Relation Age of Onset    Thyroid disease Mother     Breast cancer Mother     Hypertension Father     Breast cancer Sister     Prostate cancer Maternal Uncle     Prostate cancer Maternal Grandfather     Prostate cancer Maternal Uncle     Colon cancer Maternal Uncle     Prostate cancer Maternal Uncle     Celiac disease Neg Hx     Colon polyps Neg Hx     Crohn's disease Neg Hx     Esophageal cancer Neg Hx     Liver cancer Neg Hx     Liver disease Neg Hx     Rectal cancer Neg Hx     Stomach cancer Neg Hx      Social History     Tobacco Use    Smoking status: Former     Current packs/day: 0.00     Average packs/day: 1 pack/day for 15.0 years (15.0 ttl pk-yrs)     Types: Cigarettes     Start  date: 1985     Quit date: 2000     Years since quittin.6   Substance Use Topics    Alcohol use: Yes     Comment: very rarely    Drug use: No        Review of Systems:  Review of Systems   Constitutional:  Negative for chills and fever.       OBJECTIVE:     Vital Signs (Most Recent)  /73   Pulse 76   Wt 55 kg (121 lb 4.1 oz)   BMI 22.18 kg/m²     Physical Exam:  General: White female in no distress   Neuro: Alert and oriented to person, place, and time.  Moves all extremities.     HEENT: No icterus.  Trachea midline  Respiratory: Respirations are even and unlabored, no cough or audible wheezing  Skin: Warm dry and intact, No visible rashes, no jaundice    Labs reviewed today:  Lab Results   Component Value Date    WBC 5.11 2019    HGB 14.2 2019    HCT 41.1 2019     2019    ALT 13 2019    AST 16 2019     2019    K 4.1 2019     2019    CREATININE 1.1 2019    BUN 15 2019    CO2 25 2019    TSH 1.404 2019       Anorectal Exam:      L Sided R rash, excoriation midline posterior    ASSESSMENT/PLAN:     Diagnoses and all orders for this visit:    Anal irritation  -     clobetasol 0.05% (TEMOVATE) 0.05 % Oint; Apply topically 2 (two) times daily.  -     Ambulatory referral/consult to Dermatology; Future    Plan:  Use clobetasol cream twice a day for 7 days, then take a break for 3 days, then twice a day for 5 days. Then take a break for 3 days, then twice a day for 3 days.  If no help, then try below  For the Critic aid AF, use twice a day for for 10 days    F/u PRN    MIHAI Valdivia  Colon and Rectal Surgery

## 2023-12-27 ENCOUNTER — HOSPITAL ENCOUNTER (EMERGENCY)
Facility: HOSPITAL | Age: 48
Discharge: HOME | End: 2023-12-27
Attending: EMERGENCY MEDICINE
Payer: COMMERCIAL

## 2023-12-27 VITALS
WEIGHT: 115 LBS | HEIGHT: 62 IN | HEART RATE: 76 BPM | TEMPERATURE: 98 F | SYSTOLIC BLOOD PRESSURE: 128 MMHG | BODY MASS INDEX: 21.16 KG/M2 | RESPIRATION RATE: 18 BRPM | DIASTOLIC BLOOD PRESSURE: 71 MMHG | OXYGEN SATURATION: 100 %

## 2023-12-27 DIAGNOSIS — R21 RASH: Primary | ICD-10-CM

## 2023-12-27 PROBLEM — Z80.3 FAMILY HISTORY OF BREAST CANCER: Status: ACTIVE | Noted: 2018-06-13

## 2023-12-27 PROBLEM — G43.009 MIGRAINE WITHOUT AURA AND WITHOUT STATUS MIGRAINOSUS, NOT INTRACTABLE: Status: ACTIVE | Noted: 2019-07-30

## 2023-12-27 PROBLEM — K12.0 CANKER SORES ORAL: Status: ACTIVE | Noted: 2019-10-22

## 2023-12-27 PROCEDURE — 99283 EMERGENCY DEPT VISIT LOW MDM: CPT

## 2023-12-27 RX ORDER — HYDROCORTISONE 25 MG/G
CREAM TOPICAL 2 TIMES DAILY
Qty: 20 G | Refills: 0 | Status: SHIPPED | OUTPATIENT
Start: 2023-12-27 | End: 2023-12-27

## 2023-12-27 RX ORDER — HYDROCORTISONE 25 MG/G
CREAM TOPICAL 2 TIMES DAILY
Qty: 20 G | Refills: 0 | Status: SHIPPED | OUTPATIENT
Start: 2023-12-27

## 2023-12-27 NOTE — ED TRIAGE NOTES
Pt arrives to the ED via PV stating she started having a rash around her eyes last night. Pt states she just started taking an abx 3 days ago for a cut and believes it is a side effect of the abx. Pt denies itching. Pt states her skin feels tight on her face. Pt denies SOB or CP.

## 2023-12-27 NOTE — DISCHARGE INSTRUCTIONS
Diagnosis: Rash    I am not sure what is causing your rash your around your eyes.  It is possible this is related to the Bactrim but it does not clearly look like an allergic reaction.  I am prescribing steroid cream that you can use to help with itching and irritation.   You should discontinue taking Bactrim in case this is contributing to your rash.  I did place it on your allergy list.    Please schedule a follow-up appointment with your dermatologist or primary care doctor.  If you start to have any worsening symptoms, especially severe pain in your eyes, changes in your vision, inability to swallow, swelling of your face, blistering inside your mouth, trouble breathing or other concerns please return to the emergency department.

## 2023-12-27 NOTE — ED PROVIDER NOTES
Encounter Date: 12/27/2023       History     Chief Complaint   Patient presents with    Allergic Reaction     Started on  cephalexin  on Sunday, rash , resp even and voice clear     48-year-old female with history of Graves disease, migraines presents for reddish irritation on the lateral sides of her eyes after starting Bactrim for a finger laceration.  She noticed some reddish discoloration on the sides of her eyes, there is no significant itching or pain. She denies pain in her eyes, visual changes, other rash, ulceration in her mouth, facial swelling, throat closing, trouble breathing or nausea/ vomiting.  She does report history of 1 prior severe allergic reaction with unclear etiology which did cause blistering in her mouth.  She denies using any new soaps, lotions, make up or other products.      Review of patient's allergies indicates:   Allergen Reactions    Bactrim [sulfamethoxazole-trimethoprim] Rash     Rash around eyes     Past Medical History:   Diagnosis Date    Abnormal non-fasting glucose 2013    Graves disease     Hyperthyroidism     Migraine headache      Past Surgical History:   Procedure Laterality Date    COLONOSCOPY N/A 1/9/2020    Procedure: COLONOSCOPY;  Surgeon: Theo Turk MD;  Location: [facility] ([address] [place] [address]);  Service: Endoscopy;  Laterality: N/A;    HIP ARTHROSCOPY W/ LABRAL REPAIR  2013     Family History   Problem Relation Age of Onset    Thyroid disease Mother     Breast cancer Mother     Hypertension Father     Breast cancer Sister     Prostate cancer Maternal Uncle     Prostate cancer Maternal Grandfather     Prostate cancer Maternal Uncle     Colon cancer Maternal Uncle     Prostate cancer Maternal Uncle     Celiac disease Neg Hx     Colon polyps Neg Hx     Crohn's disease Neg Hx     Esophageal cancer Neg Hx     Liver cancer Neg Hx     Liver disease Neg Hx     Rectal cancer Neg Hx     Stomach cancer Neg Hx      Social History     Tobacco Use    Smoking status: Former     Current  packs/day: 0.00     Average packs/day: 1 pack/day for 15.0 years (15.0 ttl pk-yrs)     Types: Cigarettes     Start date: 1985     Quit date: 2000     Years since quittin.6   Substance Use Topics    Alcohol use: Yes     Comment: very rarely    Drug use: No     Review of Systems    Physical Exam     Initial Vitals [23 1141]   BP Pulse Resp Temp SpO2   128/71 76 18 98.2 °F (36.8 °C) 100 %      MAP       --         Physical Exam    Nursing note and vitals reviewed.  Constitutional: She appears well-developed and well-nourished.   HENT:   Head: Normocephalic and atraumatic.   Nose: Nose normal. No epistaxis.   Mouth/Throat: Uvula is midline, oropharynx is clear and moist and mucous membranes are normal. No trismus in the jaw. No uvula swelling.   Small erythematous patches to the lateral sides of both eyes.  There is no blistering, discharge or bleeding.    No facial or intraoral swelling, normal voice, tolerating secretions.  No ulcerations inside the mouth   Eyes: Conjunctivae and EOM are normal. Pupils are equal, round, and reactive to light.   Neck: Neck supple.   Normal range of motion.  Cardiovascular:  Normal rate, regular rhythm, normal heart sounds and intact distal pulses.     Exam reveals no gallop and no friction rub.       No murmur heard.  Pulmonary/Chest: Breath sounds normal. No respiratory distress. She has no wheezes. She has no rhonchi. She has no rales. She exhibits no tenderness.   Musculoskeletal:         General: Normal range of motion.      Cervical back: Normal range of motion and neck supple.     Neurological: She is alert.   Skin: Skin is warm. Rash noted.   Psychiatric: She has a normal mood and affect.         ED Course   Procedures  Labs Reviewed - No data to display       Imaging Results    None          Medications - No data to display  Medical Decision Making  48 year old female presenting for reddish patches for her face after starting Bactrim.  Her vitals are normal  and she is well-appearing.    Differential diagnosis:  Possible allergic reaction, however the rash is so localized it seems that this may not be due to Bactrim   Rash is bilateral, there is no involvement of the conjunctivae or eyes themselves to suggest ophthalmologic injury  Presentation is not consistent with SJS /TENS  Not consistent with anaphylaxis    I discussed with the patient that I am unsure if this is truly due to her taking Bactrim but her finger appears to be healing well so I instructed her to discontinue taking Bactrim.  Will prescribe steroid cream she was referred to Dermatology previously, instructed her to follow up with dermatology or PCP and return to the ED for worsening symptoms.  Counseled on rash and allergic reaction red flags. Stressed the importance of follow-up, strict ED return precautions given.  Patient voiced understanding and is comfortable with discharge.    Risk  Prescription drug management.                                      Clinical Impression:  Final diagnoses:  [R21] Rash (Primary)          ED Disposition Condition    Discharge Stable          ED Prescriptions       Medication Sig Dispense Start Date End Date Auth. Provider    hydrocortisone 2.5 % cream  (Status: Discontinued) Apply topically 2 (two) times daily. 20 g 12/27/2023 12/27/2023 Kallie Snow PA-C    hydrocortisone 2.5 % cream Apply topically 2 (two) times daily. 20 g 12/27/2023 -- Kallie Snow PA-C          Follow-up Information       Follow up With Specialties Details Why Contact Info Additional Information    [facility] - Dermatology [address] [place] [address] Dermatology Schedule an appointment as soon as possible for a visit in 1 week  [address]  [facility] [address] [phone] Dermatology - Main Building, Clinic 11th Floor Please park in [facility]. Use Clinic elevators 12 & 13 to get to the 11th floor    [facility] - Emergency Dept Emergency Medicine Go to  If symptoms worsen [address]  [facility] [address]  [phone]              Kallie Snow PA-C  12/27/23 1233

## 2024-02-22 ENCOUNTER — PATIENT MESSAGE (OUTPATIENT)
Dept: SURGERY | Facility: CLINIC | Age: 49
End: 2024-02-22
Payer: COMMERCIAL

## 2024-02-22 DIAGNOSIS — K62.89 ANAL IRRITATION: ICD-10-CM

## 2024-02-23 RX ORDER — CLOBETASOL PROPIONATE 0.5 MG/G
OINTMENT TOPICAL 2 TIMES DAILY
Qty: 30 G | Refills: 1 | Status: SHIPPED | OUTPATIENT
Start: 2024-02-23